# Patient Record
Sex: FEMALE | Race: BLACK OR AFRICAN AMERICAN | NOT HISPANIC OR LATINO | Employment: FULL TIME | URBAN - METROPOLITAN AREA
[De-identification: names, ages, dates, MRNs, and addresses within clinical notes are randomized per-mention and may not be internally consistent; named-entity substitution may affect disease eponyms.]

---

## 2017-05-28 ENCOUNTER — HOSPITAL ENCOUNTER (EMERGENCY)
Facility: HOSPITAL | Age: 32
Discharge: HOME | End: 2017-05-28
Attending: EMERGENCY MEDICINE
Payer: MEDICAID

## 2017-05-28 VITALS
BODY MASS INDEX: 29.88 KG/M2 | TEMPERATURE: 99 F | HEIGHT: 64 IN | HEART RATE: 51 BPM | RESPIRATION RATE: 18 BRPM | WEIGHT: 175 LBS | OXYGEN SATURATION: 100 % | DIASTOLIC BLOOD PRESSURE: 67 MMHG | SYSTOLIC BLOOD PRESSURE: 118 MMHG

## 2017-05-28 DIAGNOSIS — R10.2 SUPRAPUBIC ABDOMINAL PAIN: Primary | ICD-10-CM

## 2017-05-28 DIAGNOSIS — R10.2 PELVIC PAIN IN FEMALE: ICD-10-CM

## 2017-05-28 LAB
B-HCG UR QL: NEGATIVE
BACTERIA #/AREA URNS HPF: NORMAL /HPF
BACTERIA GENITAL QL WET PREP: ABNORMAL
BILIRUB UR QL STRIP: NEGATIVE
CLARITY UR: CLEAR
CLUE CELLS VAG QL WET PREP: ABNORMAL
COLOR UR: YELLOW
CTP QC/QA: YES
FILAMENT FUNGI VAG WET PREP-#/AREA: ABNORMAL
GLUCOSE UR QL STRIP: NEGATIVE
HGB UR QL STRIP: NEGATIVE
KETONES UR QL STRIP: NEGATIVE
LEUKOCYTE ESTERASE UR QL STRIP: ABNORMAL
MICROSCOPIC COMMENT: NORMAL
NITRITE UR QL STRIP: NEGATIVE
PH UR STRIP: 7 [PH] (ref 5–8)
PROT UR QL STRIP: NEGATIVE
RBC #/AREA URNS HPF: 1 /HPF (ref 0–4)
SP GR UR STRIP: 1.02 (ref 1–1.03)
SPECIMEN SOURCE: ABNORMAL
SQUAMOUS #/AREA URNS HPF: 3 /HPF
T VAGINALIS GENITAL QL WET PREP: ABNORMAL
URN SPEC COLLECT METH UR: ABNORMAL
UROBILINOGEN UR STRIP-ACNC: NEGATIVE EU/DL
WBC #/AREA URNS HPF: 1 /HPF (ref 0–5)
WBC #/AREA VAG WET PREP: ABNORMAL
YEAST GENITAL QL WET PREP: ABNORMAL

## 2017-05-28 PROCEDURE — 87086 URINE CULTURE/COLONY COUNT: CPT

## 2017-05-28 PROCEDURE — 87591 N.GONORRHOEAE DNA AMP PROB: CPT

## 2017-05-28 PROCEDURE — 81000 URINALYSIS NONAUTO W/SCOPE: CPT

## 2017-05-28 PROCEDURE — 87147 CULTURE TYPE IMMUNOLOGIC: CPT

## 2017-05-28 PROCEDURE — 25000003 PHARM REV CODE 250: Performed by: NURSE PRACTITIONER

## 2017-05-28 PROCEDURE — 87088 URINE BACTERIA CULTURE: CPT

## 2017-05-28 PROCEDURE — 87210 SMEAR WET MOUNT SALINE/INK: CPT

## 2017-05-28 PROCEDURE — 81025 URINE PREGNANCY TEST: CPT | Performed by: EMERGENCY MEDICINE

## 2017-05-28 PROCEDURE — 99284 EMERGENCY DEPT VISIT MOD MDM: CPT

## 2017-05-28 RX ORDER — NAPROXEN 500 MG/1
500 TABLET ORAL 2 TIMES DAILY PRN
Qty: 10 TABLET | Refills: 0 | Status: SHIPPED | OUTPATIENT
Start: 2017-05-28 | End: 2017-06-02

## 2017-05-28 RX ORDER — NAPROXEN 500 MG/1
500 TABLET ORAL
Status: COMPLETED | OUTPATIENT
Start: 2017-05-28 | End: 2017-05-28

## 2017-05-28 RX ORDER — DICYCLOMINE HYDROCHLORIDE 10 MG/1
20 CAPSULE ORAL
Status: COMPLETED | OUTPATIENT
Start: 2017-05-28 | End: 2017-05-28

## 2017-05-28 RX ORDER — DICYCLOMINE HYDROCHLORIDE 20 MG/1
20 TABLET ORAL 2 TIMES DAILY PRN
Qty: 10 TABLET | Refills: 0 | Status: SHIPPED | OUTPATIENT
Start: 2017-05-28 | End: 2017-06-27

## 2017-05-28 RX ADMIN — NAPROXEN 500 MG: 500 TABLET ORAL at 02:05

## 2017-05-28 RX ADMIN — DICYCLOMINE HYDROCHLORIDE 20 MG: 10 CAPSULE ORAL at 02:05

## 2017-05-28 NOTE — ED PROVIDER NOTES
"Encounter Date: 2017    SCRIBE #1 NOTE: I, Kailey Walker, am scribing for, and in the presence of,  Kin Pinzon NP. I have scribed the following portions of the note - Other sections scribed: HPI/ROS.       History     Chief Complaint   Patient presents with    Vaginal Pain     " I have pain in the front and back that started about 30 minutes ago, I was standing up doing hair."      Review of patient's allergies indicates:  No Known Allergies  CC: Abdominal Pain    HPI: This 31 y.o. F, LMP 17, who has no pertinent PMHx presents to the ED for evaluation of acute onset severe suprapubic cramping that radiates to the vagina and lower back that began 1.5 hours pta. The pt states that the pain feels like contractions. The pain is exacerbated by palpation and walking. No treatment attempted. She denies fever, chills, N/V/D, vaginal bleeding, vaginal discharge, frequency, urine decreased, and any other associated symptoms.       The history is provided by the patient. No  was used.     History reviewed. No pertinent past medical history.  Past Surgical History:   Procedure Laterality Date     SECTION, CLASSIC  2012    DILATION AND CURETTAGE OF UTERUS  2012    TUBAL LIGATION  2012     Family History   Problem Relation Age of Onset    Cancer Mother     Hypertension Father      Social History   Substance Use Topics    Smoking status: Former Smoker    Smokeless tobacco: Not on file    Alcohol use No     Review of Systems   Constitutional: Negative for chills and fever.   HENT: Negative for sore throat.    Eyes: Negative for visual disturbance.   Respiratory: Negative for cough.    Cardiovascular: Negative for chest pain.   Gastrointestinal: Positive for abdominal pain (suprapubic cramping). Negative for diarrhea, nausea and vomiting.   Genitourinary: Positive for vaginal pain. Negative for decreased urine volume, dysuria, frequency, vaginal bleeding and vaginal discharge. "   Musculoskeletal: Positive for back pain (lower). Negative for myalgias.   Skin: Negative for rash.   Neurological: Negative for headaches.       Physical Exam     Initial Vitals [05/28/17 1326]   BP Pulse Resp Temp SpO2   120/65 80 20 99.4 °F (37.4 °C) 100 %     Physical Exam    Nursing note and vitals reviewed.  Constitutional: She appears well-developed and well-nourished. She is not diaphoretic. She is cooperative.  Non-toxic appearance. No distress.   HENT:   Head: Normocephalic and atraumatic.   Right Ear: External ear normal.   Left Ear: External ear normal.   Eyes: Conjunctivae and EOM are normal.   Neck: Normal range of motion. No tracheal deviation present.   Cardiovascular: Normal rate, regular rhythm, normal heart sounds and intact distal pulses.   Pulmonary/Chest: Effort normal and breath sounds normal. No stridor. No tachypnea and no bradypnea. No respiratory distress. She has no wheezes. She has no rhonchi. She has no rales. She exhibits no tenderness.   Abdominal: Soft. Bowel sounds are normal. She exhibits no distension and no mass. There is tenderness in the suprapubic area. There is no rigidity, no rebound, no guarding, no CVA tenderness, no tenderness at McBurney's point and negative Dubois's sign.   Genitourinary: Pelvic exam was performed with patient supine. There is no rash, tenderness or lesion on the right labia. There is no rash, tenderness or lesion on the left labia. Cervix exhibits no motion tenderness, no discharge and no friability. Right adnexum displays no mass, no tenderness and no fullness. Left adnexum displays no mass, no tenderness and no fullness. No erythema, tenderness or bleeding in the vagina. No foreign body in the vagina. No signs of injury around the vagina. Vaginal discharge (scant white) found.   Genitourinary Comments: Exam chaperoned by: GINO Kohler.  Vaginal piercing noted.  Cervical os closed.  No cervical discharge noted.   Neurological: She is alert and oriented  to person, place, and time. She has normal strength.   Skin: Skin is warm, dry and intact. Capillary refill takes less than 2 seconds. No rash noted. No cyanosis or erythema. Nails show no clubbing.   Psychiatric: She has a normal mood and affect. Her behavior is normal. Judgment and thought content normal.         ED Course   Procedures  Labs Reviewed   URINALYSIS - Abnormal; Notable for the following:        Result Value    Leukocytes, UA Trace (*)     All other components within normal limits   VAGINAL SCREEN - Abnormal; Notable for the following:     Bacteria - Vaginal Screen Moderate (*)     All other components within normal limits   C. TRACHOMATIS/N. GONORRHOEAE BY AMP DNA   CULTURE, URINE   URINALYSIS MICROSCOPIC   POCT URINE PREGNANCY                   APC / Resident Notes:   This is an evaluation of a 31-year-old female that presents emergency Department with complaints of suprapubic abdominal cramping running down into her pelvis.  No reports of constipation.  LMP was May 13, 2017.  Physical Exam shows a non-toxic, afebrile, and well appearing female. Her abdomen is soft with tenderness only in the low mid suprapubic abdomen.  There is in the right or left lower quadrant tenderness palpation.  There is no rebound, guarding, or masses.  Bowel sounds are regular.  Pelvic exam with no CMT, adnexal tenderness or fullness.  There is a scant white discharge noted the vaginal vault.  Cervical os is closed. Vital Signs Are Reassuring. RESULTS: UPT Negative. UA: Trace leukocytes. Vaginal Screen: Moderate bacteria.  GC cultures pending.    My overall impression is suprapubic abdominal pain and pelvic pain. I considered, but at this time, do not suspect obstruction, bowel perforation, appendicitis, pregnancy, ectopic pregnancy, TOA, ovarian torsion, PID, UTI, pyelonephritis.    ED Course: Naproxen and Bentyl.  Assessment: After the medication, patient reports her abdominal cramping has improved.  Reassessment of the  abdomen with no tenderness palpation in the suprapubic, right lower quadrant or left lower quadrants.  The patient follow-up outpatient for further evaluation of cause of the of her pelvic/abdominal pain.  D/C Meds: Bentyl and Naproxen. The diagnosis, treatment plan, instructions for follow-up and reevaluation with her PCP as well as ED return precautions were discussed and understanding was verbalized. All questions or concerns have been addressed. This case was discussed with Dr. Ramsey who is in agreement with my assessment and plan. JIMENEZ Webster, QUIANAP-C             Attending Attestation:     Physician Attestation Statement for NP/PA:   I discussed this assessment and plan of this patient with the NP/PA, but I did not personally examine the patient. The face to face encounter was performed by the NP/PA.        Physician Attestation for Scribe:  Physician Attestation Statement for Scribe #1: I, Kin Pinzon NP, reviewed documentation, as scribed by Kailey Walker in my presence, and it is both accurate and complete.                 ED Course     Clinical Impression:   The primary encounter diagnosis was Suprapubic abdominal pain. A diagnosis of Pelvic pain in female was also pertinent to this visit.    Disposition:   Disposition: Discharged  Condition: Stable       YONAS Otero  05/28/17 1625       Damaso Ramsey MD  05/28/17 1958       YONAS Otero  06/02/17 1242       Damaso Ramsey MD  06/05/17 0618

## 2017-05-28 NOTE — ED TRIAGE NOTES
Pt presents pain to the lower abd that radiates down the vagina to the buttocks.  Pt states that pain comes and goes like contractions.  All pains began today.  Denies taking any medications.  Denies any other symptoms.  Pt rates 10 when pain comes.

## 2017-05-29 LAB
C TRACH DNA SPEC QL NAA+PROBE: NOT DETECTED
N GONORRHOEA DNA SPEC QL NAA+PROBE: NOT DETECTED

## 2017-05-30 LAB — BACTERIA UR CULT: NORMAL

## 2018-03-19 ENCOUNTER — HOSPITAL ENCOUNTER (EMERGENCY)
Facility: HOSPITAL | Age: 33
Discharge: HOME | End: 2018-03-19
Attending: EMERGENCY MEDICINE
Payer: MEDICAID

## 2018-03-19 VITALS
TEMPERATURE: 98 F | WEIGHT: 172 LBS | SYSTOLIC BLOOD PRESSURE: 113 MMHG | OXYGEN SATURATION: 100 % | DIASTOLIC BLOOD PRESSURE: 66 MMHG | HEART RATE: 62 BPM | HEIGHT: 64 IN | RESPIRATION RATE: 18 BRPM | BODY MASS INDEX: 29.37 KG/M2

## 2018-03-19 DIAGNOSIS — J06.9 ACUTE URI: Primary | ICD-10-CM

## 2018-03-19 DIAGNOSIS — J02.9 VIRAL PHARYNGITIS: ICD-10-CM

## 2018-03-19 LAB
B-HCG UR QL: NEGATIVE
CTP QC/QA: YES
FLUAV AG SPEC QL IA: NEGATIVE
FLUBV AG SPEC QL IA: NEGATIVE
SPECIMEN SOURCE: NORMAL

## 2018-03-19 PROCEDURE — 81025 URINE PREGNANCY TEST: CPT | Performed by: EMERGENCY MEDICINE

## 2018-03-19 PROCEDURE — 99284 EMERGENCY DEPT VISIT MOD MDM: CPT | Mod: 25

## 2018-03-19 PROCEDURE — 25000003 PHARM REV CODE 250: Performed by: PHYSICIAN ASSISTANT

## 2018-03-19 PROCEDURE — 87400 INFLUENZA A/B EACH AG IA: CPT

## 2018-03-19 RX ORDER — BENZONATATE 100 MG/1
100 CAPSULE ORAL 3 TIMES DAILY PRN
Qty: 20 CAPSULE | Refills: 0 | Status: SHIPPED | OUTPATIENT
Start: 2018-03-19 | End: 2018-03-29

## 2018-03-19 RX ORDER — ACETAMINOPHEN 325 MG/1
650 TABLET ORAL
Status: COMPLETED | OUTPATIENT
Start: 2018-03-19 | End: 2018-03-19

## 2018-03-19 RX ORDER — IBUPROFEN 200 MG
200 TABLET ORAL EVERY 6 HOURS PRN
COMMUNITY
End: 2018-05-12

## 2018-03-19 RX ADMIN — ACETAMINOPHEN 650 MG: 325 TABLET, FILM COATED ORAL at 01:03

## 2018-03-19 NOTE — ED PROVIDER NOTES
Encounter Date: 3/19/2018    SCRIBE #1 NOTE: I, CarmenPhilipKasey Stovallang, am scribing for, and in the presence of,  Amari Palacios PA-C. I have scribed the following portions of the note - Other sections scribed: HPI, ROS.       History     Chief Complaint   Patient presents with    Sore Throat     sore throat and bodyaches     CC: Sore Throat    33 y/o female with no PMHx presents to the ED c/o acute onset sore throat, generalized myalgias, subjective fever, cough, and mild rhinorrhea that started 2 days ago. The symptoms are severe (8/10). The patient attempted ibuprofen (last dose at 9:30AM today) with no relief. The patient states that her children have similar issues; however, their symptoms are alleviating. The patient states that her sore throat does not feel as severe as her hx of strep in the past. The patient denies N/V/D, otalgia, dental problem, chills, HA, abdominal pain, chest pain, or SOB. No other symptoms reported.      The history is provided by the patient. No  was used.     Review of patient's allergies indicates:  No Known Allergies  History reviewed. No pertinent past medical history.  Past Surgical History:   Procedure Laterality Date     SECTION, CLASSIC  2012    DILATION AND CURETTAGE OF UTERUS  2012    TUBAL LIGATION  2012     Family History   Problem Relation Age of Onset    Cancer Mother     Hypertension Father      Social History   Substance Use Topics    Smoking status: Former Smoker    Smokeless tobacco: Never Used    Alcohol use Yes     Review of Systems   Constitutional: Positive for fever (subjective). Negative for chills.   HENT: Positive for rhinorrhea (mild) and sore throat. Negative for dental problem and ear pain.    Eyes: Negative for redness.   Respiratory: Positive for cough. Negative for shortness of breath.    Cardiovascular: Negative for chest pain.   Gastrointestinal: Negative for abdominal pain, diarrhea, nausea and vomiting.    Genitourinary: Negative for difficulty urinating and dysuria.   Musculoskeletal: Positive for myalgias (generalized). Negative for back pain.   Skin: Negative for rash.   Neurological: Negative for headaches.       Physical Exam     Initial Vitals [03/19/18 1213]   BP Pulse Resp Temp SpO2   (!) 109/55 60 20 98.8 °F (37.1 °C) 100 %      MAP       73         Physical Exam    Nursing note and vitals reviewed.  Constitutional: She appears well-developed and well-nourished. She is not diaphoretic. No distress.   HENT:   Head: Normocephalic and atraumatic.   Nose: Nose normal.   Nasal congestion present without active rhinorrhea. No sinus TTP. TMs intact without erythema or swelling; able to discern bony landmarks. No mastoid tenderness or swelling behind the ears. No pain with manipulation of external ears. No oropharyngeal edema, swelling, erythema, tonsillar exudates, uvula deviation, changes in phonation, trismus, drooling, or cervical adenopathy. No meningeal signs.    Eyes: Conjunctivae and EOM are normal. Right eye exhibits no discharge. Left eye exhibits no discharge.   Neck: Normal range of motion. No tracheal deviation present. No JVD present.   Cardiovascular: Normal rate, regular rhythm and normal heart sounds. Exam reveals no friction rub.    No murmur heard.  Pulmonary/Chest: Breath sounds normal. No stridor. No respiratory distress. She has no wheezes. She has no rhonchi. She has no rales. She exhibits no tenderness.   Abdominal: Soft. She exhibits no distension. There is no tenderness. There is no rigidity, no rebound and no guarding.   Musculoskeletal: Normal range of motion.   Neurological: She is alert and oriented to person, place, and time.   Skin: Skin is warm and dry. No rash and no abscess noted. No erythema. No pallor.         ED Course   Procedures  Labs Reviewed   INFLUENZA A AND B ANTIGEN   POCT URINE PREGNANCY             Medical Decision Making:   History:   Old Medical Records: I decided  to obtain old medical records.      This is an urgent evaluation of a 32 y.o. female with no significant comorbidities presenting to the ED for cough associated with generalized myalgias, subjective fever, non-purulent rhinorrhea, and frontal HA. Denies abdominal pain and emesis. Vitals WNL, afebrile. Patient is non-toxic appearing and in no acute distress. Spectrum of symptoms most consistent with viral URI in this patient with reported sick contacts. Flu (-). No focal lung findings, hypoxia, or prolonged period of symptoms to warrant CXR at this time as PNA is highly unlikely. No wheezing or respiratory distress to suggest acute asthma exacerbation. 0/4 Centor criteria in the presence of typical viral URI symptoms makes acute bacterial pharyngitis/tonsilitis unlikely. No sinus TTP or purulent rhinorrhea to suggest acute bacterial rhinosinusitis at this time. No evidence of AOM, mastoiditis, PTA, Liu's, epiglottitis, and meningitis.       Discharged home with supportive care. I discussed the use of OTC medications for symptom control. I advised patient to maintain adequate hydration and advance diet as tolerated to maintain adequate nutrition. Work note issued at patient's request.    I discussed with the patient the diagnosis, treatment plan, indications for return to the emergency department, and for expected follow-up. The patient verbalized an understanding. The patient is asked if there are any questions or concerns. We discuss the case, until all issues are addressed to the patients satisfaction. Patient understands and is agreeable to the plan.     I discussed this case with Dr. Costello who is in agreement with my assessment and plan.          Scribe Attestation:   Scribe #1: I performed the above scribed service and the documentation accurately describes the services I performed. I attest to the accuracy of the note.    Attending Attestation:           Physician Attestation for Scribe:  Physician  Attestation Statement for Scribe #1: I, Amari Palacios PA-C, reviewed documentation, as scribed by Delmar Mora in my presence, and it is both accurate and complete.                    Clinical Impression:   The primary encounter diagnosis was Acute URI. A diagnosis of Viral pharyngitis was also pertinent to this visit.    Disposition:   Disposition: Discharged  Condition: Stable                        Amari Reese PA-C  03/19/18 4231

## 2018-05-12 ENCOUNTER — HOSPITAL ENCOUNTER (EMERGENCY)
Facility: HOSPITAL | Age: 33
Discharge: HOME | End: 2018-05-12
Payer: MEDICAID

## 2018-05-12 VITALS
SYSTOLIC BLOOD PRESSURE: 117 MMHG | DIASTOLIC BLOOD PRESSURE: 73 MMHG | HEIGHT: 64 IN | RESPIRATION RATE: 18 BRPM | TEMPERATURE: 100 F | BODY MASS INDEX: 29.53 KG/M2 | HEART RATE: 71 BPM | OXYGEN SATURATION: 98 % | WEIGHT: 173 LBS

## 2018-05-12 DIAGNOSIS — L03.012 PARONYCHIA OF FINGER OF LEFT HAND: Primary | ICD-10-CM

## 2018-05-12 LAB
B-HCG UR QL: NEGATIVE
CTP QC/QA: YES

## 2018-05-12 PROCEDURE — 63600175 PHARM REV CODE 636 W HCPCS: Performed by: PHYSICIAN ASSISTANT

## 2018-05-12 PROCEDURE — 99283 EMERGENCY DEPT VISIT LOW MDM: CPT | Mod: 25

## 2018-05-12 PROCEDURE — 81025 URINE PREGNANCY TEST: CPT | Performed by: PHYSICIAN ASSISTANT

## 2018-05-12 PROCEDURE — 10060 I&D ABSCESS SIMPLE/SINGLE: CPT | Mod: F7

## 2018-05-12 PROCEDURE — 96372 THER/PROPH/DIAG INJ SC/IM: CPT

## 2018-05-12 PROCEDURE — 25000003 PHARM REV CODE 250: Performed by: PHYSICIAN ASSISTANT

## 2018-05-12 RX ORDER — IBUPROFEN 600 MG/1
600 TABLET ORAL EVERY 6 HOURS PRN
Qty: 20 TABLET | Refills: 0 | Status: SHIPPED | OUTPATIENT
Start: 2018-05-12 | End: 2018-05-17

## 2018-05-12 RX ORDER — MUPIROCIN 20 MG/G
1 OINTMENT TOPICAL
Status: COMPLETED | OUTPATIENT
Start: 2018-05-12 | End: 2018-05-12

## 2018-05-12 RX ORDER — MUPIROCIN 20 MG/G
OINTMENT TOPICAL 3 TIMES DAILY
Qty: 15 G | Refills: 0 | Status: SHIPPED | OUTPATIENT
Start: 2018-05-12 | End: 2018-05-19

## 2018-05-12 RX ORDER — KETOROLAC TROMETHAMINE 30 MG/ML
15 INJECTION, SOLUTION INTRAMUSCULAR; INTRAVENOUS
Status: COMPLETED | OUTPATIENT
Start: 2018-05-12 | End: 2018-05-12

## 2018-05-12 RX ORDER — ACETAMINOPHEN 500 MG
500 TABLET ORAL EVERY 4 HOURS PRN
Qty: 20 TABLET | Refills: 0 | Status: SHIPPED | OUTPATIENT
Start: 2018-05-12 | End: 2018-05-17

## 2018-05-12 RX ADMIN — KETOROLAC TROMETHAMINE 15 MG: 30 INJECTION, SOLUTION INTRAMUSCULAR; INTRAVENOUS at 08:05

## 2018-05-12 RX ADMIN — MUPIROCIN 22 G: 20 OINTMENT TOPICAL at 08:05

## 2018-05-13 NOTE — DISCHARGE INSTRUCTIONS
Apply Bactroban as prescribed. Keep area clean, dry and covered. Run warm water over the area three times a day for 5 minutes for 1 week. Follow up with primary care in 2 days for wound check. Return to ER for worsening symptoms, fever or as needed.

## 2018-05-13 NOTE — ED PROVIDER NOTES
"Encounter Date: 2018         History     Chief Complaint   Patient presents with    Hand Pain     "I have an abscess on my left middle finger. I googled it and it and that's what it told me."     CC: Hand Pain    HPI:   Pt is a 31 y/o female who presents for evaluation of atraumatic pain to 3rd digit of L hand x 1 week. Pt denies fever, chills, nausea, vomiting, purulent drainage. Attempted tx with Ibuprofen.           Review of patient's allergies indicates:  No Known Allergies  History reviewed. No pertinent past medical history.  Past Surgical History:   Procedure Laterality Date     SECTION, CLASSIC  2012    DILATION AND CURETTAGE OF UTERUS  2012    TUBAL LIGATION  2012     Family History   Problem Relation Age of Onset    Cancer Mother     Hypertension Father      Social History   Substance Use Topics    Smoking status: Former Smoker    Smokeless tobacco: Never Used    Alcohol use Yes     Review of Systems   Constitutional: Negative for chills and fever.   Gastrointestinal: Negative for nausea and vomiting.   Musculoskeletal: Negative for myalgias.   Skin: Positive for color change. Negative for rash and wound.       Physical Exam     Initial Vitals [18 1957]   BP Pulse Resp Temp SpO2   117/73 71 18 99.6 °F (37.6 °C) 98 %      MAP       87.67         Physical Exam    Nursing note and vitals reviewed.  Constitutional: She appears well-developed and well-nourished. No distress.   Cardiovascular: Normal rate and regular rhythm. Exam reveals no gallop and no friction rub.    No murmur heard.  Pulses:       Radial pulses are 2+ on the right side, and 2+ on the left side.   Pulmonary/Chest: Breath sounds normal. She has no wheezes. She has no rhonchi. She has no rales.   Musculoskeletal: Normal range of motion.   Skin: Skin is warm and dry. Abscess noted. No erythema.   Paronychia to lateral nail fold of L 3rd digit          ED Course   I & D - Incision and Drainage  Date/Time: 2018 " 8:59 PM  Location procedure was performed: [facility] EMERGENCY DEPARTMENT  Performed by: ALLISON SCHMIDT  Authorized by: ALLISON SCHMIDT   Pre-operative diagnosis: paronychia of 3rd digit or R hand  Consent Done: Yes  Type: abscess  Body area: upper extremity  Location details: right long finger  Description of findings: paronychia R 3rd digit   Drainage: purulent  Drainage amount: scant  Wound treatment: incision and  drainage  Complications: No  Estimated blood loss (mL): 1  Patient tolerance: Patient tolerated the procedure well with no immediate complications  Comments: 18 gauge needle used to drain paronychia          Labs Reviewed   POCT URINE PREGNANCY             Medical Decision Making:   Initial Assessment:   Patient is a 32-year-old female who presents for evaluation of pain to 3rd digit of right hand x1 week.  Patient denies fever, chills, nausea, vomiting.  Patient is afebrile, pain in his chest.  Exam findings detailed above.  This appears to be a paronychia.  I and D performed per procedure note.  Patient tolerated procedure well.  Bactroban applied in the ED. Patient is discharged in stable condition with Bactroban, Lodine and Tylenol as needed for pain and swelling.  PCP follow-up in 2 days for recheck.  ER return precautions discussed with her worsening symptoms, new symptoms or as needed.  Discussed this patient with Dr. Duke who agrees with a ssessment and plan.                       Clinical Impression:   The encounter diagnosis was Paronychia of finger of left hand.                           Allison Schmidt PA-C  05/12/18 [id number]

## 2018-10-03 NOTE — DISCHARGE INSTRUCTIONS
Please return to the Emergency Department for any new or worsening symptoms including: worsening or changes in your pain type or locatio, fever, chest pain, shortness of breath, loss of consciousness, dizziness, weakness, or any other concerns.     Please follow up with your Primary Care Provider within in the week. If you do not have a Primary Care Provider, you may contact the one listed on your discharge paperwork or you may also call the [facility] Appointment Desk at [phone] to schedule an appointment with a Primary Care Provider.     Please take all medication as prescribed. Bentyl for Abdominal Cramping/Pain.  Naproxen for lower mid abdominal pain. This is an Non-Steroidal Anti-Inflammatory (NSAID) Medication. Please do not take any additional NSAIDs while you are taking this medication including (Advil, Aleve, Motrin, Ibuprofen, Mobic\meloxicam, Naprosyn, etc.). Please stop taking this medication if you experience: weakness, itching, yellow skin or eyes, joint pains, vomiting blood, blood or black stools, unusual weight gain, or swelling in your arms, legs, hands, or feet.    Roberto Nguyen), Internal Medicine; Pulmonary Disease; Sleep Medicine  [address] [place] [address]  Phone: [phone]  Fax: [phone]    FU with PCP,   Phone: (   )    -  Fax: (   )    - FU with PCP,   Phone: (   )    -  Fax: (   )    -    Shadi Giordano), Internal Medicine; Pulmonary Disease  [address] [place] [address]  Phone: [phone]  Fax: [phone]

## 2019-08-22 ENCOUNTER — HOSPITAL ENCOUNTER (EMERGENCY)
Facility: HOSPITAL | Age: 34
Discharge: HOME | End: 2019-08-22
Attending: EMERGENCY MEDICINE
Payer: MEDICAID

## 2019-08-22 VITALS
RESPIRATION RATE: 16 BRPM | SYSTOLIC BLOOD PRESSURE: 131 MMHG | HEART RATE: 52 BPM | OXYGEN SATURATION: 100 % | DIASTOLIC BLOOD PRESSURE: 76 MMHG | WEIGHT: 185 LBS | BODY MASS INDEX: 31.58 KG/M2 | TEMPERATURE: 98 F | HEIGHT: 64 IN

## 2019-08-22 DIAGNOSIS — M25.519 SHOULDER PAIN: ICD-10-CM

## 2019-08-22 DIAGNOSIS — M54.2 NECK PAIN: ICD-10-CM
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
## 2019-08-22 DIAGNOSIS — M25.559 HIP PAIN: ICD-10-CM

## 2019-08-22 DIAGNOSIS — M79.659 THIGH PAIN: ICD-10-CM

## 2019-08-22 LAB
B-HCG UR QL: NEGATIVE
CTP QC/QA: YES

## 2019-08-22 PROCEDURE — 81025 URINE PREGNANCY TEST: CPT | Performed by: EMERGENCY MEDICINE

## 2019-08-22 PROCEDURE — 25000003 PHARM REV CODE 250: Performed by: PHYSICIAN ASSISTANT

## 2019-08-22 PROCEDURE — 99284 EMERGENCY DEPT VISIT MOD MDM: CPT | Mod: 25

## 2019-08-22 RX ORDER — IBUPROFEN 600 MG/1
600 TABLET ORAL
Status: COMPLETED | OUTPATIENT
Start: 2019-08-22 | End: 2019-08-22

## 2019-08-22 RX ORDER — LIDOCAINE 50 MG/G
1 PATCH TOPICAL DAILY
Qty: 15 PATCH | Refills: 0 | Status: SHIPPED | OUTPATIENT
Start: 2019-08-22 | End: 2019-09-06

## 2019-08-22 RX ORDER — ACETAMINOPHEN 500 MG
500 TABLET ORAL EVERY 4 HOURS PRN
Qty: 20 TABLET | Refills: 0 | Status: SHIPPED | OUTPATIENT
Start: 2019-08-22 | End: 2019-08-22 | Stop reason: CLARIF

## 2019-08-22 RX ORDER — CYCLOBENZAPRINE HCL 10 MG
10 TABLET ORAL 3 TIMES DAILY PRN
Qty: 20 TABLET | Refills: 0 | Status: SHIPPED | OUTPATIENT
Start: 2019-08-22 | End: 2019-08-29

## 2019-08-22 RX ORDER — OXYCODONE AND ACETAMINOPHEN 5; 325 MG/1; MG/1
1 TABLET ORAL EVERY 6 HOURS PRN
Qty: 15 TABLET | Refills: 0 | Status: SHIPPED | OUTPATIENT
Start: 2019-08-22 | End: 2019-08-25

## 2019-08-22 RX ORDER — LIDOCAINE 50 MG/G
1 PATCH TOPICAL
Status: DISCONTINUED | OUTPATIENT
Start: 2019-08-22 | End: 2019-08-22 | Stop reason: HOSPADM

## 2019-08-22 RX ORDER — CYCLOBENZAPRINE HCL 10 MG
10 TABLET ORAL 3 TIMES DAILY PRN
Qty: 20 TABLET | Refills: 0 | Status: SHIPPED | OUTPATIENT
Start: 2019-08-22 | End: 2019-08-22 | Stop reason: SDUPTHER

## 2019-08-22 RX ORDER — IBUPROFEN 600 MG/1
600 TABLET ORAL EVERY 6 HOURS PRN
Qty: 20 TABLET | Refills: 0 | Status: SHIPPED | OUTPATIENT
Start: 2019-08-22 | End: 2019-08-27

## 2019-08-22 RX ADMIN — LIDOCAINE 1 PATCH: 50 PATCH TOPICAL at 10:08

## 2019-08-22 RX ADMIN — IBUPROFEN 600 MG: 600 TABLET, FILM COATED ORAL at 11:08
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
## 2019-08-22 NOTE — DISCHARGE INSTRUCTIONS
Take medications as prescribed for pain. Do not bear weight on right leg until cleared by orthopedics. Follow up with primary care orthopedics in 1-2 days and return to ER for worsening symptoms or as needed.

## 2019-12-17 ENCOUNTER — HOSPITAL ENCOUNTER (EMERGENCY)
Facility: HOSPITAL | Age: 34
Discharge: HOME | End: 2019-12-17
Attending: EMERGENCY MEDICINE
Payer: MEDICAID

## 2019-12-17 VITALS
WEIGHT: 190 LBS | TEMPERATURE: 99 F | HEART RATE: 62 BPM | DIASTOLIC BLOOD PRESSURE: 58 MMHG | RESPIRATION RATE: 20 BRPM | BODY MASS INDEX: 32.44 KG/M2 | HEIGHT: 64 IN | SYSTOLIC BLOOD PRESSURE: 107 MMHG | OXYGEN SATURATION: 97 %

## 2019-12-17 DIAGNOSIS — G43.909 MIGRAINE WITHOUT STATUS MIGRAINOSUS, NOT INTRACTABLE, UNSPECIFIED MIGRAINE TYPE: Primary | ICD-10-CM

## 2019-12-17 LAB
B-HCG UR QL: NEGATIVE
CTP QC/QA: YES

## 2019-12-17 PROCEDURE — 99284 EMERGENCY DEPT VISIT MOD MDM: CPT | Mod: 25

## 2019-12-17 PROCEDURE — 96372 THER/PROPH/DIAG INJ SC/IM: CPT

## 2019-12-17 PROCEDURE — 63600175 PHARM REV CODE 636 W HCPCS: Performed by: NURSE PRACTITIONER

## 2019-12-17 PROCEDURE — 81025 URINE PREGNANCY TEST: CPT | Performed by: NURSE PRACTITIONER

## 2019-12-17 RX ORDER — KETOROLAC TROMETHAMINE 30 MG/ML
30 INJECTION, SOLUTION INTRAMUSCULAR; INTRAVENOUS
Status: COMPLETED | OUTPATIENT
Start: 2019-12-17 | End: 2019-12-17

## 2019-12-17 RX ORDER — IBUPROFEN 800 MG/1
800 TABLET ORAL EVERY 8 HOURS PRN
Qty: 30 TABLET | Refills: 0 | Status: SHIPPED | OUTPATIENT
Start: 2019-12-17 | End: 2021-12-30 | Stop reason: SDUPTHER

## 2019-12-17 RX ORDER — METOCLOPRAMIDE HYDROCHLORIDE 5 MG/ML
10 INJECTION INTRAMUSCULAR; INTRAVENOUS
Status: COMPLETED | OUTPATIENT
Start: 2019-12-17 | End: 2019-12-17

## 2019-12-17 RX ADMIN — KETOROLAC TROMETHAMINE 30 MG: 30 INJECTION, SOLUTION INTRAMUSCULAR; INTRAVENOUS at 11:12

## 2019-12-17 RX ADMIN — METOCLOPRAMIDE 10 MG: 5 INJECTION, SOLUTION INTRAMUSCULAR; INTRAVENOUS at 11:12

## 2019-12-17 NOTE — ED PROVIDER NOTES
Encounter Date: 2019    SCRIBE #1 NOTE: I, Monik Ashley, am scribing for, and in the presence of,  Ami Ramos NP. I have scribed the following portions of the note - Other sections scribed: HPI, ROS, PE.       History     Chief Complaint   Patient presents with    Generalized Body Aches     c/o body aches, nausea and fatigue for approx the past x3 days     This is a 34 y.o. female who presents to the ED complaining of left-sided headache that started 3 days ago. She reports associated diaphoresis, blurry vision, dizziness, nasal congestion, cough, and nausea.  She states a PMHx of migraine headache and adds that this doesn't feel as severe. She notes taking Ibuprofen last night for treatment, but it didn't provide any relief. She denies follow up with a neurologist. She states that her last known menstrual period was on 2019. She denies any allergies to medications. Denies sore throat and vomiting. No other associated symptoms.    The history is provided by the patient. No  was used.     Review of patient's allergies indicates:  No Known Allergies  History reviewed. No pertinent past medical history.  Past Surgical History:   Procedure Laterality Date     SECTION, CLASSIC  2012    DILATION AND CURETTAGE OF UTERUS  2012    TUBAL LIGATION  2012     Family History   Problem Relation Age of Onset    Cancer Mother     Hypertension Father      Social History     Tobacco Use    Smoking status: Former Smoker    Smokeless tobacco: Never Used   Substance Use Topics    Alcohol use: Yes    Drug use: No     Review of Systems   Constitutional: Positive for diaphoresis. Negative for fever (subjective).   HENT: Positive for congestion. Negative for sore throat.    Eyes: Positive for visual disturbance (blurry vision).   Respiratory: Positive for cough. Negative for shortness of breath.    Cardiovascular: Negative for chest pain.   Gastrointestinal: Positive for nausea.  Negative for vomiting.   Genitourinary: Negative for dysuria.   Musculoskeletal: Negative for back pain.   Skin: Negative for rash.   Neurological: Positive for dizziness, weakness ( strength) and headaches (left-sided).   Hematological: Does not bruise/bleed easily.       Physical Exam     Initial Vitals [12/17/19 1041]   BP Pulse Resp Temp SpO2   128/69 71 18 99.6 °F (37.6 °C) 100 %      MAP       --         Physical Exam    Nursing note and vitals reviewed.  Constitutional: She appears well-developed and well-nourished. No distress.   HENT:   Head: Normocephalic and atraumatic.   Right Ear: Tympanic membrane normal.   Left Ear: Tympanic membrane normal.   Nose: Nose normal.   Mouth/Throat: Uvula is midline, oropharynx is clear and moist and mucous membranes are normal.   Eyes: Conjunctivae and EOM are normal. Pupils are equal, round, and reactive to light.   Neck: Normal range of motion. Neck supple.   Cardiovascular: Normal rate, regular rhythm and normal heart sounds. Exam reveals no gallop and no friction rub.    No murmur heard.  Pulmonary/Chest: Effort normal and breath sounds normal. No respiratory distress. She has no wheezes. She has no rhonchi. She has no rales.   Musculoskeletal: Normal range of motion.   Lymphadenopathy:     She has no cervical adenopathy.   Neurological: She is alert and oriented to person, place, and time. She has normal strength and normal reflexes. She displays normal reflexes. No cranial nerve deficit or sensory deficit. GCS score is 15. GCS eye subscore is 4. GCS verbal subscore is 5. GCS motor subscore is 6.   Speech clear, gait steady  Normal finger to nose  Negative Romberg   Skin: Skin is warm and dry. Capillary refill takes less than 2 seconds.   Psychiatric: She has a normal mood and affect.         ED Course   Procedures  Labs Reviewed   POCT URINE PREGNANCY          Imaging Results    None          Medical Decision Making:   Differential Diagnosis:   Migraine, viral  illness, vertigo  ED Management:  Diagnosis management comments: This is an urgent evaluation of a  34-year-old female that presented to the ER with c/o headache, nausea, dizziness, visual changes and feeling off. Pts exam was as above.     Labs were reviewed and discussed with pt.     Based on patient's symptoms I believe she is having a complex migraine.  She reports the headache as throbbing and one-sided along with the associated symptoms.  Injection of Toradol and Reglan given to patient in the emergency department.  Upon reassessment patient states that her headache is completely resolved and she is pain-free and asymptomatic.  We have discussed migraines and keeping a headache diarrhea along with follow up with her primary care provider.  She is to take Motrin or Excedrin migraine at onset of headache.  She is to return to the ER for worsening condition or any other concerns.    Based on exam today - I have low suspicion for medical, surgical or other life threatening condition and I believe pt is safe for discharge and outpatient f/u.    Pt verbalizes understanding of d/c instructions and will return for worsening condition.                Scribe Attestation:   Scribe #1: I performed the above scribed service and the documentation accurately describes the services I performed. I attest to the accuracy of the note.                          Clinical Impression:       ICD-10-CM ICD-9-CM   1. Migraine without status migrainosus, not intractable, unspecified migraine type G43.909 346.90         Disposition:   Disposition: Discharged  Condition: Stable    Scribe attestation: I, Ami Ramos NP-C  , personally performed the services described in this documentation. All medical record entries made by the scribe were at my direction and in my presence.  I have reviewed the chart and agree that the record reflects my personal performance and is accurate and complete.                 Ami Ramos,  NP  12/17/19 1253

## 2021-07-01 ENCOUNTER — PATIENT MESSAGE (OUTPATIENT)
Dept: ADMINISTRATIVE | Facility: OTHER | Age: 36
End: 2021-07-01

## 2021-12-30 ENCOUNTER — HOSPITAL ENCOUNTER (EMERGENCY)
Facility: HOSPITAL | Age: 36
Discharge: HOME | End: 2021-12-30
Attending: EMERGENCY MEDICINE
Payer: MEDICAID

## 2021-12-30 VITALS
HEART RATE: 56 BPM | TEMPERATURE: 97 F | BODY MASS INDEX: 32.44 KG/M2 | DIASTOLIC BLOOD PRESSURE: 57 MMHG | OXYGEN SATURATION: 100 % | HEIGHT: 64 IN | WEIGHT: 190 LBS | SYSTOLIC BLOOD PRESSURE: 114 MMHG | RESPIRATION RATE: 18 BRPM

## 2021-12-30 DIAGNOSIS — M25.539 WRIST PAIN: ICD-10-CM

## 2021-12-30 DIAGNOSIS — M79.641 HAND PAIN, RIGHT: Primary | ICD-10-CM

## 2021-12-30 LAB
B-HCG UR QL: NEGATIVE
CTP QC/QA: YES

## 2021-12-30 PROCEDURE — 99284 EMERGENCY DEPT VISIT MOD MDM: CPT | Mod: 25

## 2021-12-30 PROCEDURE — 81025 URINE PREGNANCY TEST: CPT | Performed by: EMERGENCY MEDICINE

## 2021-12-30 PROCEDURE — 25000003 PHARM REV CODE 250: Performed by: EMERGENCY MEDICINE

## 2021-12-30 PROCEDURE — 29125 APPL SHORT ARM SPLINT STATIC: CPT | Mod: RT

## 2021-12-30 RX ORDER — IBUPROFEN 800 MG/1
800 TABLET ORAL EVERY 8 HOURS PRN
Qty: 20 TABLET | Refills: 0 | Status: SHIPPED | OUTPATIENT
Start: 2021-12-30

## 2021-12-30 RX ORDER — IBUPROFEN 400 MG/1
800 TABLET ORAL
Status: COMPLETED | OUTPATIENT
Start: 2021-12-30 | End: 2021-12-30

## 2021-12-30 RX ADMIN — IBUPROFEN 800 MG: 400 TABLET ORAL at 01:12

## 2021-12-30 NOTE — ED TRIAGE NOTES
Pt. Complains of right hand pain. Pt. States that about 1 month ago she may have injured her right hand while doing a twisting motion. Pt. States that the pain has been off and on since then. Pt. States that her hand has been hurting for the last 3 days. Pt. States that it mainly hurts when she types or press anything. Pt. Rates her pain at an 8/10 on the pain scale.

## 2021-12-30 NOTE — ED PROVIDER NOTES
Encounter Date: 2021       History     Chief Complaint   Patient presents with    Hand Pain     Pt reports R hand pain that started 2 months ago.  States pain has worsened.  Denies taking anything for the pain.      36-year-old female who denies past medical history presents with right hand pain.  Symptoms started several months ago after twisting to turn a lid off of a jar.  She states since that time she has had intermittent pain to the right hand and right wrist.  She is right-hand dominant.  Over the past several days, pain has become constant severe.  She has not taken any medications for pain at home.  Pain is a throbbing pain.  She works as a dental assistant.        Review of patient's allergies indicates:  No Known Allergies  History reviewed. No pertinent past medical history.  Past Surgical History:   Procedure Laterality Date     SECTION, CLASSIC  2012    DILATION AND CURETTAGE OF UTERUS  2012    TUBAL LIGATION  2012     Family History   Problem Relation Age of Onset    Cancer Mother     Hypertension Father      Social History     Tobacco Use    Smoking status: Former Smoker    Smokeless tobacco: Never Used   Substance Use Topics    Alcohol use: Yes    Drug use: No     Review of Systems   Constitutional: Negative for fever.   Genitourinary: Negative for difficulty urinating.   Musculoskeletal: Positive for arthralgias. Negative for joint swelling.   Skin: Negative for rash.   Allergic/Immunologic: Negative for immunocompromised state.       Physical Exam     Initial Vitals [21 1140]   BP Pulse Resp Temp SpO2   (!) 114/57 (!) 56 18 97.4 °F (36.3 °C) 100 %      MAP       --         Physical Exam    Nursing note and vitals reviewed.  Constitutional: She appears well-developed and well-nourished. She is not diaphoretic. No distress.   HENT:   Head: Normocephalic and atraumatic.   Eyes: Conjunctivae are normal.   Cardiovascular: Normal rate and regular rhythm.   Pulses:        Radial pulses are 2+ on the right side.   Pulmonary/Chest: No respiratory distress.   Musculoskeletal:         General: No edema.        Hands:       Comments: Tender palpation on the right palm, diffusely over right wrist.  There is no swelling, erythema or skin wounds noted.     Neurological: She is alert and oriented to person, place, and time.   Sensation to light touch intact in radial, ulnar, median nerve distribution of the right hand.  Patient is able to abduct and abduct fingers, performed has some pronation, 5/5  strength.   Skin: Skin is warm and dry.   Psychiatric: She has a normal mood and affect.         ED Course   Procedures  Labs Reviewed   POCT URINE PREGNANCY          Imaging Results          X-Ray Hand 3 View Right (Final result)  Result time 12/30/21 12:58:47    Final result by Israel Urias MD (12/30/21 12:58:47)                 Impression:      Small ossific body dorsal to the carpals without donor site seen.  Differential considerations can include a small accessory ossicle versus sequela of recent or remote trauma with small chip fracture.  Correlate for point tenderness at this region.      Electronically signed by: Israel Urias MD  Date:    12/30/2021  Time:    12:58             Narrative:    EXAMINATION:  XR WRIST COMPLETE 3 VIEWS RIGHT; XR HAND COMPLETE 3 VIEW RIGHT    CLINICAL HISTORY:  hand pain; Pain in unspecified wrist    TECHNIQUE:  PA, lateral, and oblique views of the right wrist and also right hand were performed.    COMPARISON:  None    FINDINGS:  Bones are well mineralized. Overall alignment is within normal limits.  Carpus appears well aligned.  Small ossific body dorsal to the 1st carpal row best seen on the lateral views without donor site seen.  Remainder of the hand is within normal limits.  No dislocation or destructive osseous process. Joint spaces appear relatively maintained. No subcutaneous emphysema or radiodense retained foreign body.                                X-Ray Wrist Complete Right (Final result)  Result time 12/30/21 12:58:47    Final result by Israel Urias MD (12/30/21 12:58:47)                 Impression:      Small ossific body dorsal to the carpals without donor site seen.  Differential considerations can include a small accessory ossicle versus sequela of recent or remote trauma with small chip fracture.  Correlate for point tenderness at this region.      Electronically signed by: Israel Urias MD  Date:    12/30/2021  Time:    12:58             Narrative:    EXAMINATION:  XR WRIST COMPLETE 3 VIEWS RIGHT; XR HAND COMPLETE 3 VIEW RIGHT    CLINICAL HISTORY:  hand pain; Pain in unspecified wrist    TECHNIQUE:  PA, lateral, and oblique views of the right wrist and also right hand were performed.    COMPARISON:  None    FINDINGS:  Bones are well mineralized. Overall alignment is within normal limits.  Carpus appears well aligned.  Small ossific body dorsal to the 1st carpal row best seen on the lateral views without donor site seen.  Remainder of the hand is within normal limits.  No dislocation or destructive osseous process. Joint spaces appear relatively maintained. No subcutaneous emphysema or radiodense retained foreign body.                                 Medications   ibuprofen tablet 800 mg (800 mg Oral Given 12/30/21 1305)     Medical Decision Making:   Initial Assessment:   36-year-old female presenting with right wrist and right hand pain, ongoing for several months.  She is not taking any medications at home.  On exam, she has some tenderness over the right wrist and right palm, neurovascularly intact, no erythema or swelling noted.  I suspect arthritis 1st carpal tunnel syndrome.  Give her 4 of minor trauma prior to symptoms, will get x-ray to rule out occult fracture dislocation.  If negative, will discharge with referral to Orthopedics and NSAID.             ED Course as of 12/30/21 1604   Thu Dec 30, 2021   1318 Patient with osteophytic  density in the volar wrist, this is not necessarily correlate with the areas of tenderness.  I will however, placed patient in his wrist splint for stabilization and refer to orthopedics as planned. [LH]      ED Course User Index  [LH] Sade Murdock MD             Clinical Impression:   Final diagnoses:  [M25.539] Wrist pain  [M79.641] Hand pain, right (Primary)          ED Disposition Condition    Discharge Stable       This dictation has been generated using M-Modal Fluency Direct dictation; some phonetic errors may occur.     ED Prescriptions     Medication Sig Dispense Start Date End Date Auth. Provider    ibuprofen (ADVIL,MOTRIN) 800 MG tablet Take 1 tablet (800 mg total) by mouth every 8 (eight) hours as needed for Pain. 20 tablet 12/30/2021  Sade Murdock MD        Follow-up Information     Follow up With Specialties Details Why Contact Info    Tanya Horan III, MD Orthopedic Surgery Schedule an appointment as soon as possible for a visit   [address] [facility] [address]  [facility] [address]  [phone]      [facility] Emergency Medicine  As needed, If symptoms worsen [address] [facility] [id number] [phone]           Sade Murdock MD  12/30/21 3118

## 2022-01-26 ENCOUNTER — HOSPITAL ENCOUNTER (OUTPATIENT)
Dept: RADIOLOGY | Facility: HOSPITAL | Age: 37
Discharge: HOME | End: 2022-01-26
Attending: PLASTIC SURGERY
Payer: MEDICAID

## 2022-01-26 DIAGNOSIS — Z01.818 PREOPERATIVE TESTING: Primary | ICD-10-CM

## 2022-01-26 DIAGNOSIS — Z01.818 PRE-OPERATIVE EXAM: ICD-10-CM

## 2022-01-26 DIAGNOSIS — Z01.818 PRE-OPERATIVE EXAM: Primary | ICD-10-CM

## 2022-01-26 PROCEDURE — 71046 X-RAY EXAM CHEST 2 VIEWS: CPT | Mod: 26,,, | Performed by: RADIOLOGY

## 2022-01-26 PROCEDURE — 71046 X-RAY EXAM CHEST 2 VIEWS: CPT | Mod: TC,FY

## 2022-01-26 PROCEDURE — 71046 XR CHEST PA AND LATERAL: ICD-10-PCS | Mod: 26,,, | Performed by: RADIOLOGY

## 2022-01-27 ENCOUNTER — HOSPITAL ENCOUNTER (OUTPATIENT)
Dept: CARDIOLOGY | Facility: HOSPITAL | Age: 37
Discharge: HOME | End: 2022-01-27
Attending: PLASTIC SURGERY
Payer: MEDICAID

## 2022-01-27 DIAGNOSIS — Z01.818 PREOPERATIVE TESTING: ICD-10-CM

## 2022-01-27 PROCEDURE — 93010 EKG 12-LEAD: ICD-10-PCS | Mod: ,,, | Performed by: INTERNAL MEDICINE

## 2022-01-27 PROCEDURE — 93005 ELECTROCARDIOGRAM TRACING: CPT

## 2022-01-27 PROCEDURE — 93010 ELECTROCARDIOGRAM REPORT: CPT | Mod: ,,, | Performed by: INTERNAL MEDICINE

## 2022-08-22 ENCOUNTER — HOSPITAL ENCOUNTER (EMERGENCY)
Facility: HOSPITAL | Age: 37
Discharge: HOME | End: 2022-08-22
Attending: EMERGENCY MEDICINE
Payer: MEDICAID

## 2022-08-22 VITALS
OXYGEN SATURATION: 100 % | HEIGHT: 64 IN | BODY MASS INDEX: 29.88 KG/M2 | DIASTOLIC BLOOD PRESSURE: 68 MMHG | SYSTOLIC BLOOD PRESSURE: 125 MMHG | HEART RATE: 76 BPM | RESPIRATION RATE: 18 BRPM | WEIGHT: 175 LBS | TEMPERATURE: 98 F

## 2022-08-22 DIAGNOSIS — M43.6 TORTICOLLIS, ACUTE: Primary | ICD-10-CM

## 2022-08-22 LAB
B-HCG UR QL: NEGATIVE
CTP QC/QA: YES

## 2022-08-22 PROCEDURE — 81025 URINE PREGNANCY TEST: CPT | Performed by: EMERGENCY MEDICINE

## 2022-08-22 PROCEDURE — 99284 EMERGENCY DEPT VISIT MOD MDM: CPT

## 2022-08-22 PROCEDURE — 63600175 PHARM REV CODE 636 W HCPCS: Performed by: PHYSICIAN ASSISTANT

## 2022-08-22 PROCEDURE — 96372 THER/PROPH/DIAG INJ SC/IM: CPT | Performed by: PHYSICIAN ASSISTANT

## 2022-08-22 PROCEDURE — 25000003 PHARM REV CODE 250: Performed by: PHYSICIAN ASSISTANT

## 2022-08-22 RX ORDER — KETOROLAC TROMETHAMINE 30 MG/ML
15 INJECTION, SOLUTION INTRAMUSCULAR; INTRAVENOUS
Status: COMPLETED | OUTPATIENT
Start: 2022-08-22 | End: 2022-08-22

## 2022-08-22 RX ORDER — CYCLOBENZAPRINE HCL 10 MG
10 TABLET ORAL 3 TIMES DAILY PRN
Qty: 15 TABLET | Refills: 0 | Status: SHIPPED | OUTPATIENT
Start: 2022-08-22 | End: 2022-08-26

## 2022-08-22 RX ORDER — ORPHENADRINE CITRATE 100 MG/1
100 TABLET, EXTENDED RELEASE ORAL ONCE
Status: COMPLETED | OUTPATIENT
Start: 2022-08-22 | End: 2022-08-22

## 2022-08-22 RX ORDER — LIDOCAINE 50 MG/G
1 PATCH TOPICAL DAILY
Qty: 6 PATCH | Refills: 0 | Status: SHIPPED | OUTPATIENT
Start: 2022-08-22

## 2022-08-22 RX ORDER — MELOXICAM 7.5 MG/1
7.5 TABLET ORAL DAILY
Qty: 12 TABLET | Refills: 0 | Status: SHIPPED | OUTPATIENT
Start: 2022-08-22

## 2022-08-22 RX ADMIN — ORPHENADRINE CITRATE 100 MG: 100 TABLET, EXTENDED RELEASE ORAL at 12:08

## 2022-08-22 RX ADMIN — KETOROLAC TROMETHAMINE 15 MG: 30 INJECTION, SOLUTION INTRAMUSCULAR at 12:08

## 2022-08-22 NOTE — Clinical Note
"Nadeen Zamorarachel Jorge was seen and treated in our emergency department on 8/22/2022.  She may return to work on 08/25/2022.       If you have any questions or concerns, please don't hesitate to call.      Amari Reese PA-C"

## 2022-08-22 NOTE — ED PROVIDER NOTES
Encounter Date: 2022    SCRIBE #1 NOTE: I, Guille Perez, am scribing for, and in the presence of,  Amari Reese PA-C. I have scribed the following portions of the note - Other sections scribed: HPI, ROS.       History     Chief Complaint   Patient presents with    Torticollis     Pt states that she has neck stiffness, she went to 3 different urgent care facilities and they were booked.      CC: Neck pain    HPI: This is a 36 y.o. F who has no PMHx who presents to the ED for emergent evaluation of acute atraumatic neck pain that began yesterday that progressively worsened today. Pt has associated neck stiffness that began upon waking yesterday morning. She states that she is unable to turn her head to the left. Pt describes the neck pain as a pinching sensation. She reports 2 episodes of similar problem, in which she was seen at an urgent care facility and by her PCP in the past. She reports receiving a Toradol injection with relief in the past. She took Ibuprofen 800mg yesterday. No OTC treatment attempted PTA. 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 Pt denies fever, vision changes, vomiting, or diarrhea.        The history is provided by the patient. No  was used.     Review of patient's allergies indicates:  No Known Allergies  No past medical history on file.  Past Surgical History:   Procedure Laterality Date     SECTION, CLASSIC  2012    DILATION AND CURETTAGE OF UTERUS  2012    TUBAL LIGATION  2012     Family History   Problem Relation Age of Onset    Cancer Mother     Hypertension Father      Social History     Tobacco Use    Smoking status: Former Smoker    Smokeless tobacco: Never Used   Substance Use Topics    Alcohol use: Yes    Drug use: No     Review of Systems   Constitutional: Negative for chills and fever.   HENT: Negative for sore throat.     Eyes: Negative for visual disturbance.   Respiratory: Negative for cough and shortness of breath.    Cardiovascular: Negative for chest pain.   Gastrointestinal: Negative for abdominal pain, diarrhea, nausea and vomiting.   Genitourinary: Negative for dysuria.   Musculoskeletal: Positive for neck pain and neck stiffness. Negative for back pain.   Skin: Negative for rash.   Neurological: Negative for weakness.   Hematological: Does not bruise/bleed easily.       Physical Exam     Initial Vitals [08/22/22 1143]   BP Pulse Resp Temp SpO2   119/72 60 18 98 °F (36.7 °C) 98 %      MAP       --         Physical Exam    Nursing note and vitals reviewed.  Constitutional: She appears well-developed and well-nourished. She is not diaphoretic. No distress.   HENT:   Head: Atraumatic.   Right Ear: External ear normal.   Left Ear: External ear normal.   Eyes: Conjunctivae and EOM are normal.   Neck: No tracheal deviation present.   Normal range of motion.  Cardiovascular: Normal rate and regular rhythm.   Pulmonary/Chest: No accessory muscle usage or stridor. No tachypnea. No respiratory distress.   Musculoskeletal:         General: No edema. Normal range of motion.      Cervical back: Normal range of motion.      Comments: Reproducible positional tenderness to the left trapezius musculature with palpable spasming.  No midline or bony deformities to the cervical spine.  No skin changes.  No JVD.  No meningismus.     Neurological: She is alert and oriented to person, place, and time. She displays no tremor. She displays no seizure activity. Coordination and gait normal.   Skin: Skin is intact. Capillary refill takes less than 2 seconds. No rash noted. No erythema.         ED Course   Procedures  Labs Reviewed   POCT URINE PREGNANCY          Imaging Results    None          Medications   ketorolac injection 15 mg (15 mg Intramuscular Given 8/22/22 1214)   orphenadrine 12 hr tablet 100 mg (100 mg Oral Given 8/22/22 1215)      Medical Decision Making:   History:   Old Medical Records: I decided to obtain old medical records.  ED Management:  Presentation consistent with torticollis.  No history of trauma.  No meningismus.  I doubt vascular injury and infectious process.  Advising follow-up with PCP. Strict return precautions discussed.  Agreeable to plan.          Scribe Attestation:   Scribe #1: I performed the above scribed service and the documentation accurately describes the services I performed. I attest to the accuracy of the note.               I,Amari Reese PA-C, personally performed the services described in this documentation. All medical record entries made by the scribe were at my direction and in my presence.  I have reviewed the chart and agree that the record reflects my personal performance and is accurate and complete.  Clinical Impression:   Final diagnoses:  [M43.6] Torticollis, acute (Primary)          ED Disposition Condition    Discharge Stable        ED Prescriptions     Medication Sig Dispense Start Date End Date Auth. Provider    meloxicam (MOBIC) 7.5 MG tablet Take 1 tablet (7.5 mg total) by mouth once daily. 12 tablet 8/22/2022  Amari Reese PA-C    cyclobenzaprine (FLEXERIL) 10 MG tablet Take 1 tablet (10 mg total) by mouth 3 (three) times daily as needed for Muscle spasms. 15 tablet 8/22/2022 8/26/2022 Amari Reese PA-C    LIDOcaine (LIDODERM) 5 % Place 1 patch onto the skin once daily. Remove & Discard patch within 12 hours or as directed by MD. May use 4% formulation if more affordable for patient. 6 patch 8/22/2022  Amari Reese PA-C        Follow-up Information     Follow up With Specialties Details Why Contact Info    Elena Josue MD Family Medicine Schedule an appointment as soon as possible for a visit in 1 day For re-evaluation [address] [facility] [id number] [phone]      [facility] Emergency Medicine Go to  If  symptoms worsen [facility] [address] [facility] [id number] [phone]           Amari Reese PA-C  08/22/22 6901

## 2022-08-22 NOTE — DISCHARGE INSTRUCTIONS
